# Patient Record
Sex: MALE | Race: WHITE
[De-identification: names, ages, dates, MRNs, and addresses within clinical notes are randomized per-mention and may not be internally consistent; named-entity substitution may affect disease eponyms.]

---

## 2021-01-20 ENCOUNTER — HOSPITAL ENCOUNTER (INPATIENT)
Dept: HOSPITAL 56 - MW.ED | Age: 36
Discharge: LEFT BEFORE DISCHARGE | DRG: 603 | End: 2021-01-20
Attending: INTERNAL MEDICINE | Admitting: INTERNAL MEDICINE
Payer: SELF-PAY

## 2021-01-20 DIAGNOSIS — Z20.822: ICD-10-CM

## 2021-01-20 DIAGNOSIS — F17.200: ICD-10-CM

## 2021-01-20 DIAGNOSIS — L03.115: Primary | ICD-10-CM

## 2021-01-20 DIAGNOSIS — B95.62: ICD-10-CM

## 2021-01-20 LAB
BUN SERPL-MCNC: 19 MG/DL (ref 7–18)
CHLORIDE SERPL-SCNC: 103 MMOL/L (ref 98–107)
CO2 SERPL-SCNC: 28.3 MMOL/L (ref 21–32)
GLUCOSE SERPL-MCNC: 108 MG/DL (ref 74–106)
POTASSIUM SERPL-SCNC: 3.9 MMOL/L (ref 3.5–5.1)
SODIUM SERPL-SCNC: 139 MMOL/L (ref 136–148)

## 2021-01-20 PROCEDURE — C9113 INJ PANTOPRAZOLE SODIUM, VIA: HCPCS

## 2021-01-20 PROCEDURE — U0002 COVID-19 LAB TEST NON-CDC: HCPCS

## 2021-01-20 NOTE — EDM.PDOC
ED HPI GENERAL MEDICAL PROBLEM





- General


Chief Complaint: Skin Complaint


Stated Complaint: POSSIBLE SPIDER BITE


Time Seen by Provider: 01/20/21 13:10


Source of Information: Reports: Patient


History Limitations: Reports: No Limitations





- History of Present Illness


INITIAL COMMENTS - FREE TEXT/NARRATIVE: 


HISTORY AND PHYSICAL:





History of present illness:


Patient is a 35-year-old male who presents to the ED with complaints of an 

"infected spider bite" to his right inner knee.  Patient states he felt a "bump"

under the skin of his right medial knee 3 days ago.  He states he has been able 

to express some drainage from the site within the past 24 to 48 hours.  He 

states the area is now red and inflamed with redness extending from mid thigh to

his ankle.  He denies history of intravenous drug use.  He denies recent injury 

or trauma to the area. Patient denies any fever, chills, headache, change in 

vision, syncope or near syncope. Denies any chest pain, back pain, shortness of 

breath or cough. Denies any abdominal pain, nausea, vomiting, diarrhea, 

constipation or dysuria. Has not noted any blood in urine or stool. Patient has 

been eating and drinking appropriately.





Review of systems: 


As per history of present illness and below otherwise all systems reviewed and 

negative.





Past medical history: 


As per history of present illness and as reviewed below otherwise nonco

ntributory.





Surgical history: 


As per history of present illness and as reviewed below otherwise 

noncontributory.





Social history: 


See social history for further information





Family history: 


As per history of present illness and as reviewed below otherwise 

noncontributory.





Physical exam:


General: Well developed and well nourished 35 year old male. Alert and 

orientated x 3. Nontoxic in appearance and in no acute distress. Vital signs are

stable and have been reviewed by me. Nursing notes were reviewed. 


HEENT: Atraumatic, normocephalic, pupils equal and reactive bilaterally, 

negative for conjunctival pallor or scleral icterus, mucous membranes moist, 

trachea midline. No drooling or trismus noted. No meningeal signs. No hot potato

voice noted. 


Lungs: Clear to auscultation bilaterally. No wheezes, rales, or rhonchi.   Chest

nontender. Normal work of breathing, no accessory muscles used.


Heart: S1S2, regular rate and rhythm without overt murmur, gallops, or rubs. No 

JVD. No peripheral edema


Abdomen: Soft, nondistended, nontender. Normoactive bowel sounds. Negative for 

masses or costovertebral tenderness.


Skin: Red open area to right medial knee roughly 1 inch in diameter, multiple 

dried pustules at surface. No drainage present.  Firm to touch, nonfluctuant. 

Visible erythema extending from right mid thigh to ankle.  Areas of redness are 

tender to touch.  The cellulitis was outlined with surgical marker for 

comparison.


Hematologic: No petechiae or purpra. Mucosa appropriate color and normal nail 

bed color and refill.


Extremities: Atraumatic, moves all extremities per self without difficulty or 

deficits, negative for cords or calf pain. SEE SKIN for details. Neurovascular 

unremarkable.


Neuro: Awake, alert, oriented. Cranial nerves II through XII unremarkable. 

Cerebellum unremarkable. Motor and sensory unremarkable throughout. Exam 

nonfocal.


Psychiatric: Mood and affect are appropriate.  Normal thought process. Answering

questions appropriately.





Notes:


*This patient was seen and evaluated during the 2020 SARS-CoV-2 novel 

coronavirus pandemic period.  Community viral transmission is ongoing at time of

this encounter and the emergency department is operating under pandemic response

procedures.





Patient does have a leukocytosis.  Negative lactate.  Blood cultures are 

pending.  Vancomycin hung at this time for the cellulitis.  


X-ray shows soft tissue swelling medial to the knee joint. No bony erosions to 

suggest osteomyelitis. No soft tissue gas is noted.  Negative COVID screening. I

have talked with the patient about today's findings, in addition to providing 

specific details for plan of care.  Reassessment at the time of disposition 

demonstrates that the patient is in no acute distress.  He is agreeable for 

admission.





1445: Dr Ward, hospitalist on-call was consulted on this case.  She is 

agreeable to excepting this patient for further care and management.





Diagnostics:


CBC, CMP, Lactate w/ reflex, BC x 2, wound culture





Therapeutics:


IV fluids, Dilaudid, Vancomycin, nicotine patch





Impression: 


Cellulitis





Plan:


Inpatient admission





Definitive disposition and diagnosis as appropriate pending reevaluation and 

review of above.





  ** sore on right knee, right leg


Pain Score (Numeric/FACES): 8





- Related Data


                                    Allergies











Allergy/AdvReac Type Severity Reaction Status Date / Time


 


No Known Allergies Allergy   Verified 01/20/21 12:30











Home Meds: 


                                    Home Meds





. [No Known Home Meds]  01/20/21 [History]











Past Medical History





- Past Health History


Medical/Surgical History: Denies Medical/Surgical History





Social & Family History





- Family History


Family Medical History: No Pertinent Family History





- Tobacco Use


Tobacco Use Status *Q: Current Every Day Tobacco User


Years of Tobacco use: 20


Packs/Tins Daily: 0.1





- Recreational Drug Use


Recreational Drug Use: Yes


Drug Use in Last 12 Months: Yes


Recreational Drug Type: Reports: Marijuana/Hashish


Recreational Drug Use Frequency: Monthly





ED ROS GENERAL





- Review of Systems


Review Of Systems: Comprehensive ROS is negative, except as noted in HPI.





ED EXAM, SKIN/RASH


Exam: See Below (See dictation)





Course





- Vital Signs


Last Recorded V/S: 


                                Last Vital Signs











Temp  98.6 F   01/20/21 16:13


 


Pulse  82   01/20/21 16:13


 


Resp  18   01/20/21 16:13


 


BP  136/85   01/20/21 16:13


 


Pulse Ox  97   01/20/21 16:13














- Orders/Labs/Meds


Orders: 


                               Active Orders 24 hr











 Category Date Time Status


 


 Admission Status [Patient Status] [ADT] Stat ADT  01/20/21 15:20 Active


 


 CULTURE BLOOD [BC] Stat Lab  01/20/21 13:33 Received


 


 CULTURE BLOOD [BC] Stat Lab  01/20/21 13:54 Received


 


 CULTURE WOUND [RM] Stat Lab  01/20/21 13:23 Received


 


 Nicotine [Habitrol] Med  01/20/21 15:15 Active





 21 mg TRDERM DAILY   


 


 Blood Culture x2 Reflex Set [OM.PC] Stat Oth  01/20/21 13:25 Ordered








                                Medication Orders





Acetaminophen (Tylenol)  650 mg PO Q4H PRN


   PRN Reason: Pain (Mild 1-3)/fever


Pantoprazole Sodium 40 mg/ (Sodium Chloride)  10 mls @ 300 mls/hr IV Q24H PAPITO


Vancomycin HCl 1 gm/ Sodium (Chloride)  250 mls @ 166.667 mls/hr IV Q8H PAPITO


Ibuprofen (Motrin)  400 mg PO Q6H PRN


   PRN Reason: Pain (mild 1-3)


Nicotine (Habitrol)  21 mg TRDERM DAILY PAPITO


   Last Admin: 01/20/21 15:47  Dose: 21 mg


   Documented by: MURDNIC


Ondansetron HCl (Zofran Odt)  4 mg PO Q4H PRN


   PRN Reason: nausea, able to take PO


Temazepam (Restoril)  15 mg PO BEDTIME PRN


   PRN Reason: Sleep


Vancomycin HCl (Pharmacy To Dose - Vancomycin)  1 dose .XX ASDIRECTED [facility]








Labs: 


                                Laboratory Tests











  01/20/21 01/20/21 01/20/21 Range/Units





  13:33 13:33 13:33 


 


WBC  13.05 H    (4.0-11.0)  K/uL


 


RBC  5.02    (4.50-5.90)  M/uL


 


Hgb  15.0    (13.0-17.0)  g/dL


 


Hct  45.3    (38.0-50.0)  %


 


MCV  90.2    (80.0-98.0)  fL


 


MCH  29.9    (27.0-32.0)  pg


 


MCHC  33.1    (31.0-37.0)  g/dL


 


RDW Std Deviation  42.8    (28.0-62.0)  fl


 


RDW Coeff of Ector  13    (11.0-15.0)  %


 


Plt Count  230    (150-400)  K/uL


 


MPV  9.50    (7.40-12.00)  fL


 


Neut % (Auto)  74.2    (48.0-80.0)  %


 


Lymph % (Auto)  16.2    (16.0-40.0)  %


 


Mono % (Auto)  8.6    (0.0-15.0)  %


 


Eos % (Auto)  0.8    (0.0-7.0)  %


 


Baso % (Auto)  0.2    (0.0-1.5)  %


 


Neut # (Auto)  9.7 H    (1.4-5.7)  K/uL


 


Lymph # (Auto)  2.1    (0.6-2.4)  K/uL


 


Mono # (Auto)  1.1 H    (0.0-0.8)  K/uL


 


Eos # (Auto)  0.1    (0.0-0.7)  K/uL


 


Baso # (Auto)  0.0    (0.0-0.1)  K/uL


 


Nucleated RBC %  0.0    /100WBC


 


Nucleated RBCs #  0    K/uL


 


Lactate   0.6   (0.20-2.00)  mmol/L


 


Sodium    139  (136-148)  mmol/L


 


Potassium    3.9  (3.5-5.1)  mmol/L


 


Chloride    103  ()  mmol/L


 


Carbon Dioxide    28.3  (21.0-32.0)  mmol/L


 


BUN    19 H  (7.0-18.0)  mg/dL


 


Creatinine    1.0  (0.8-1.3)  mg/dL


 


Est Cr Clr Drug Dosing    98.80  mL/min


 


Estimated GFR (MDRD)    > 60.0  ml/min


 


Glucose    108 H  ()  mg/dL


 


Calcium    9.3  (8.5-10.1)  mg/dL


 


Total Bilirubin    0.4  (0.2-1.0)  mg/dL


 


AST    16  (15-37)  IU/L


 


ALT    25  (14-63)  IU/L


 


Alkaline Phosphatase    94  ()  U/L


 


Total Protein    7.6  (6.4-8.2)  g/dL


 


Albumin    4.0  (3.4-5.0)  g/dL


 


Globulin    3.6  (2.6-4.0)  g/dL


 


Albumin/Globulin Ratio    1.1  (0.9-1.6)  


 


SARS-CoV-2 RNA (FERNANDO)     (NEGATIVE)  














  01/20/21 Range/Units





  13:35 


 


WBC   (4.0-11.0)  K/uL


 


RBC   (4.50-5.90)  M/uL


 


Hgb   (13.0-17.0)  g/dL


 


Hct   (38.0-50.0)  %


 


MCV   (80.0-98.0)  fL


 


MCH   (27.0-32.0)  pg


 


MCHC   (31.0-37.0)  g/dL


 


RDW Std Deviation   (28.0-62.0)  fl


 


RDW Coeff of Ector   (11.0-15.0)  %


 


Plt Count   (150-400)  K/uL


 


MPV   (7.40-12.00)  fL


 


Neut % (Auto)   (48.0-80.0)  %


 


Lymph % (Auto)   (16.0-40.0)  %


 


Mono % (Auto)   (0.0-15.0)  %


 


Eos % (Auto)   (0.0-7.0)  %


 


Baso % (Auto)   (0.0-1.5)  %


 


Neut # (Auto)   (1.4-5.7)  K/uL


 


Lymph # (Auto)   (0.6-2.4)  K/uL


 


Mono # (Auto)   (0.0-0.8)  K/uL


 


Eos # (Auto)   (0.0-0.7)  K/uL


 


Baso # (Auto)   (0.0-0.1)  K/uL


 


Nucleated RBC %   /100WBC


 


Nucleated RBCs #   K/uL


 


Lactate   (0.20-2.00)  mmol/L


 


Sodium   (136-148)  mmol/L


 


Potassium   (3.5-5.1)  mmol/L


 


Chloride   ()  mmol/L


 


Carbon Dioxide   (21.0-32.0)  mmol/L


 


BUN   (7.0-18.0)  mg/dL


 


Creatinine   (0.8-1.3)  mg/dL


 


Est Cr Clr Drug Dosing   mL/min


 


Estimated GFR (MDRD)   ml/min


 


Glucose   ()  mg/dL


 


Calcium   (8.5-10.1)  mg/dL


 


Total Bilirubin   (0.2-1.0)  mg/dL


 


AST   (15-37)  IU/L


 


ALT   (14-63)  IU/L


 


Alkaline Phosphatase   ()  U/L


 


Total Protein   (6.4-8.2)  g/dL


 


Albumin   (3.4-5.0)  g/dL


 


Globulin   (2.6-4.0)  g/dL


 


Albumin/Globulin Ratio   (0.9-1.6)  


 


SARS-CoV-2 RNA (FERNANDO)  NEGATIVE  (NEGATIVE)  











Meds: 


Medications











Generic Name Dose Route Start Last Admin





  Trade Name Freq  PRN Reason Stop Dose Admin


 


Acetaminophen  650 mg  01/20/21 15:52 





  Tylenol  PO  





  Q4H PRN  





  Pain (Mild 1-3)/fever  


 


Pantoprazole Sodium 40 mg/  10 mls @ 300 mls/hr  01/20/21 16:00 





  Sodium Chloride  IV  





  Q24H PAPITO  


 


Vancomycin HCl 1 gm/ Sodium  250 mls @ 166.667 mls/hr  01/20/21 16:30 





  Chloride  IV  





  Q8H PAPITO  


 


Ibuprofen  400 mg  01/20/21 15:52 





  Motrin  PO  





  Q6H PRN  





  Pain (mild 1-3)  


 


Nicotine  21 mg  01/20/21 15:15  01/20/21 15:47





  Habitrol  TRDERM   21 mg





  DAILY PAPITO   Administration


 


Ondansetron HCl  4 mg  01/20/21 15:52 





  Zofran Odt  PO  





  Q4H PRN  





  nausea, able to take PO  


 


Temazepam  15 mg  01/20/21 21:00 





  Restoril  PO  





  BEDTIME PRN  





  Sleep  


 


Vancomycin HCl  1 dose  01/20/21 16:00 





  Pharmacy To Dose - Vancomycin  .XX  





  ASDIRECTED PAPITO  














Discontinued Medications














Generic Name Dose Route Start Last Admin





  Trade Name An  PRN Reason Stop Dose Admin


 


Hydromorphone HCl  1 mg  01/20/21 13:25  01/20/21 13:34





  Dilaudid  IVPUSH  01/20/21 13:26  1 mg





  ONETIME ONE   Administration


 


Sodium Chloride  1,000 mls @ 999 mls/hr  01/20/21 13:25  01/20/21 13:34





  Normal Saline  IV  01/20/21 14:25  999 mls/hr





  STAT ONE   Administration


 


Vancomycin HCl 1 gm/ Sodium  250 mls @ 166 mls/hr  01/20/21 13:55  01/20/21 

14:41





  Chloride  IV  01/20/21 15:25  166 mls/hr





  ONETIME ONE   Administration














Departure





- Departure


Time of Disposition: 16:23


Disposition: Admitted As Inpatient 65


Clinical Impression: 


Cellulitis


Qualifiers:


 Site of cellulitis: extremity Site of cellulitis of extremity: lower extremity 

Laterality: right Qualified Code(s): L03.115 - Cellulitis of right lower limb








- Discharge Information





Sepsis Event Note (ED)





- Evaluation


Sepsis Screening Result: No Definite Risk





- Focused Exam


Vital Signs: 


                                   Vital Signs











  Temp Pulse Resp BP Pulse Ox


 


 01/20/21 15:35   81   142/76 H  98


 


 01/20/21 12:27  98.1 F  84  16  146/82 H  98














- My Orders


Last 24 Hours: 


My Active Orders





01/20/21 13:23


CULTURE WOUND [RM] Stat 





01/20/21 13:25


Blood Culture x2 Reflex Set [OM.PC] Stat 





01/20/21 13:33


CULTURE BLOOD [BC] Stat 





01/20/21 13:54


CULTURE BLOOD [BC] Stat 





01/20/21 15:15


Nicotine [Habitrol]   21 mg TRDERM DAILY 





01/20/21 15:20


Admission Status [Patient Status] [ADT] Stat 














- Assessment/Plan


Last 24 Hours: 


My Active Orders





01/20/21 13:23


CULTURE WOUND [RM] Stat 





01/20/21 13:25


Blood Culture x2 Reflex Set [OM.PC] Stat 





01/20/21 13:33


CULTURE BLOOD [BC] Stat 





01/20/21 13:54


CULTURE BLOOD [BC] Stat 





01/20/21 15:15


Nicotine [Habitrol]   21 mg TRDERM DAILY 





01/20/21 15:20


Admission Status [Patient Status] [ADT] Stat

## 2021-01-20 NOTE — CR
Indication:



Draining wound medial to patella 



Technique:



Three views right knee



Comparison:



None



Findings:



Bones: Alignment is normal. No fractures or bone lesions. No bony erosions. 

Joint spaces: Unremarkable.  



Soft tissues: Soft tissue swelling medial to the knee joint. No soft tissue 

gas. 



Impression:



Soft tissue swelling medial to the knee joint. No bony erosions to suggest 

osteomyelitis.



Dictated by Katya Choe MD @ Jan 20 2021  2:25PM



Signed by Dr. Katya Choe @ Jan 20 2021  2:27PM

## 2021-01-20 NOTE — US
INDICATION:



Swelling over the medial right knee



TECHNIQUE:



Limited grayscale and color Doppler imaging of the soft tissues medial to 

the right knee performed. 



COMPARISON:



Knee radiographs from same date  



FINDINGS/IMPRESSION: :



There is soft tissue edema medial to the right knee. No abscess or foreign 

body identified.



Dictated by Katya Choe MD @ Jan 20 2021  6:14PM



Signed by Dr. Katya Choe @ Jan 20 2021  6:16PM

## 2021-01-20 NOTE — PCM.HP.2
<Tramaine Anguiano - Last Filed: 01/20/21 19:25>





H&P History of Present Illness





- General


Date of Service: 01/20/21


Admit Problem/Dx: 


                           Admission Diagnosis/Problem





Admission Diagnosis/Problem      Cellulitis








Source of Information: Patient





- History of Present Illness


Initial Comments - Free Text/Narative: 


Patient is a 35-year-old male with a significant past medical history of tobacco

abuse presenting to the ED secondary to infected spider bite to the right inner 

knee.  Patient endorsed to ED provider bump underneath the skin of the right 

knee 3 days prior.  However did notice today that the area is now red and 

inflamed and redness is extending from the mid thigh to the ankle.  Patient 

denies any fevers, chills, body aches, chest pain, shortness of breath.  Denies 

any recent injury and or trauma to the area.  Endorses no history of IV drug 

abuse.  And otherwise is feeling well and in no acute distress.





ED course:


Knee x-ray: Soft tissue swelling noted at medial aspect of knee joint no bony 

erosions to suggest osteomyelitis.


Blood cultures and wound cultures ordered.


Vancomycin initiated.


1 dose of Dilaudid for pain control provided.





Bedside:


Mentions pain in knee especially with movement. Otherwise denies anyother acute 

distress at this time. 





  ** sore on right knee, right leg


Pain Score (Numeric/FACES): 8





- Related Data


Allergies/Adverse Reactions: 


                                    Allergies











Allergy/AdvReac Type Severity Reaction Status Date / Time


 


No Known Allergies Allergy   Verified 01/20/21 12:30











Home Medications: 


                                    Home Meds





. [No Known Home Meds]  01/20/21 [History]











Past Medical History





- Past Health History


Medical/Surgical History: Denies Medical/Surgical History





Social & Family History





- Family History


Family Medical History: No Pertinent Family History





- Tobacco Use


Tobacco Use Status *Q: Current Every Day Tobacco User


Years of Tobacco use: 20


Packs/Tins Daily: 0.1





- Recreational Drug Use


Recreational Drug Use: Yes


Drug Use in Last 12 Months: Yes


Recreational Drug Type: Reports: Marijuana/Hashish


Recreational Drug Use Frequency: Monthly





H&P Review of Systems





- Review of Systems:


Review Of Systems: See Below


General: Denies: Fever, Chills, Malaise, Weakness, Fatigue


HEENT: Reports: No Symptoms


Pulmonary: Reports: No Symptoms


Cardiovascular: Reports: No Symptoms


Gastrointestinal: Reports: No Symptoms


Musculoskeletal: Reports: Joint Pain


Skin: Reports: Erythema


Psychiatric: Reports: No Symptoms


Neurological: Reports: No Symptoms





Exam





- Exam


Exam: See Below





- Vital Signs


Vital Signs: 


                                Last Vital Signs











Temp  98.1 F   01/20/21 12:27


 


Pulse  81   01/20/21 15:35


 


Resp  16   01/20/21 12:27


 


BP  142/76 H  01/20/21 15:35


 


Pulse Ox  98   01/20/21 15:35











Weight: 74.843 kg





- Exam


Quality Assessment: No: Supplemental Oxygen


General: Alert, Oriented, Cooperative


HEENT: EOMI, Mucosa Moist & Pink


Lungs: Clear to Auscultation, Normal Respiratory Effort


Cardiovascular: Regular Rate, Regular Rhythm


GI/Abdominal Exam: Soft, Non-Tender


Back Exam: Normal Inspection


Extremities: Increased Warmth, Redness, Other (Right knee: medial aspect: 

irregular area area of skin beakdown w. overlying granualtion tissue; bloody 

discharge; exquisite tenderness. ROM of knee joint intact. NV intact. sensation 

intact. mild soft tissue swelling noted of right lower extremity compared to 

left. ).  No: Slow Capillary Refill, Tommy's Sign, Limited Range of Motion


Neuro Extensive - Mental Status: Alert, Oriented x3


Psychiatric: Alert, Normal Affect





- Patient Data


Lab Results Last 24 hrs: 


                         Laboratory Results - last 24 hr











  01/20/21 01/20/21 01/20/21 Range/Units





  13:33 13:33 13:33 


 


WBC  13.05 H    (4.0-11.0)  K/uL


 


RBC  5.02    (4.50-5.90)  M/uL


 


Hgb  15.0    (13.0-17.0)  g/dL


 


Hct  45.3    (38.0-50.0)  %


 


MCV  90.2    (80.0-98.0)  fL


 


MCH  29.9    (27.0-32.0)  pg


 


MCHC  33.1    (31.0-37.0)  g/dL


 


RDW Std Deviation  42.8    (28.0-62.0)  fl


 


RDW Coeff of Ector  13    (11.0-15.0)  %


 


Plt Count  230    (150-400)  K/uL


 


MPV  9.50    (7.40-12.00)  fL


 


Neut % (Auto)  74.2    (48.0-80.0)  %


 


Lymph % (Auto)  16.2    (16.0-40.0)  %


 


Mono % (Auto)  8.6    (0.0-15.0)  %


 


Eos % (Auto)  0.8    (0.0-7.0)  %


 


Baso % (Auto)  0.2    (0.0-1.5)  %


 


Neut # (Auto)  9.7 H    (1.4-5.7)  K/uL


 


Lymph # (Auto)  2.1    (0.6-2.4)  K/uL


 


Mono # (Auto)  1.1 H    (0.0-0.8)  K/uL


 


Eos # (Auto)  0.1    (0.0-0.7)  K/uL


 


Baso # (Auto)  0.0    (0.0-0.1)  K/uL


 


Nucleated RBC %  0.0    /100WBC


 


Nucleated RBCs #  0    K/uL


 


Lactate   0.6   (0.20-2.00)  mmol/L


 


Sodium    139  (136-148)  mmol/L


 


Potassium    3.9  (3.5-5.1)  mmol/L


 


Chloride    103  ()  mmol/L


 


Carbon Dioxide    28.3  (21.0-32.0)  mmol/L


 


BUN    19 H  (7.0-18.0)  mg/dL


 


Creatinine    1.0  (0.8-1.3)  mg/dL


 


Est Cr Clr Drug Dosing    98.80  mL/min


 


Estimated GFR (MDRD)    > 60.0  ml/min


 


Glucose    108 H  ()  mg/dL


 


Calcium    9.3  (8.5-10.1)  mg/dL


 


Total Bilirubin    0.4  (0.2-1.0)  mg/dL


 


AST    16  (15-37)  IU/L


 


ALT    25  (14-63)  IU/L


 


Alkaline Phosphatase    94  ()  U/L


 


Total Protein    7.6  (6.4-8.2)  g/dL


 


Albumin    4.0  (3.4-5.0)  g/dL


 


Globulin    3.6  (2.6-4.0)  g/dL


 


Albumin/Globulin Ratio    1.1  (0.9-1.6)  


 


SARS-CoV-2 RNA (FERNANDO)     (NEGATIVE)  














  01/20/21 Range/Units





  13:35 


 


WBC   (4.0-11.0)  K/uL


 


RBC   (4.50-5.90)  M/uL


 


Hgb   (13.0-17.0)  g/dL


 


Hct   (38.0-50.0)  %


 


MCV   (80.0-98.0)  fL


 


MCH   (27.0-32.0)  pg


 


MCHC   (31.0-37.0)  g/dL


 


RDW Std Deviation   (28.0-62.0)  fl


 


RDW Coeff of Ector   (11.0-15.0)  %


 


Plt Count   (150-400)  K/uL


 


MPV   (7.40-12.00)  fL


 


Neut % (Auto)   (48.0-80.0)  %


 


Lymph % (Auto)   (16.0-40.0)  %


 


Mono % (Auto)   (0.0-15.0)  %


 


Eos % (Auto)   (0.0-7.0)  %


 


Baso % (Auto)   (0.0-1.5)  %


 


Neut # (Auto)   (1.4-5.7)  K/uL


 


Lymph # (Auto)   (0.6-2.4)  K/uL


 


Mono # (Auto)   (0.0-0.8)  K/uL


 


Eos # (Auto)   (0.0-0.7)  K/uL


 


Baso # (Auto)   (0.0-0.1)  K/uL


 


Nucleated RBC %   /100WBC


 


Nucleated RBCs #   K/uL


 


Lactate   (0.20-2.00)  mmol/L


 


Sodium   (136-148)  mmol/L


 


Potassium   (3.5-5.1)  mmol/L


 


Chloride   ()  mmol/L


 


Carbon Dioxide   (21.0-32.0)  mmol/L


 


BUN   (7.0-18.0)  mg/dL


 


Creatinine   (0.8-1.3)  mg/dL


 


Est Cr Clr Drug Dosing   mL/min


 


Estimated GFR (MDRD)   ml/min


 


Glucose   ()  mg/dL


 


Calcium   (8.5-10.1)  mg/dL


 


Total Bilirubin   (0.2-1.0)  mg/dL


 


AST   (15-37)  IU/L


 


ALT   (14-63)  IU/L


 


Alkaline Phosphatase   ()  U/L


 


Total Protein   (6.4-8.2)  g/dL


 


Albumin   (3.4-5.0)  g/dL


 


Globulin   (2.6-4.0)  g/dL


 


Albumin/Globulin Ratio   (0.9-1.6)  


 


SARS-CoV-2 RNA (FERNANDO)  NEGATIVE  (NEGATIVE)  











Result Diagrams: 


                                 01/20/21 13:33





                                 01/20/21 13:33





Sepsis Event Note





- Evaluation


Sepsis Screening Result: No Definite Risk





- Focused Exam


Vital Signs: 


                                   Vital Signs











  Temp Pulse Resp BP Pulse Ox


 


 01/20/21 15:35   81   142/76 H  98


 


 01/20/21 12:27  98.1 F  84  16  146/82 H  98











Problem List Initiated/Reviewed/Updated: Yes


Orders Last 24hrs: 


                               Active Orders 24 hr











 Category Date Time Status


 


 Admission Status [Patient Status] [ADT] Stat ADT  01/20/21 15:20 Active


 


 Oxygen Therapy [RC] PRN Care  01/20/21 15:52 Ordered


 


 Up ad Ava [RC] ASDIRECTED Care  01/20/21 15:52 Ordered


 


 VTE/DVT Education [RC] PER UNIT ROUTINE Care  01/20/21 15:52 Ordered


 


 Vital Signs [RC] Q4H Care  01/20/21 15:52 Ordered


 


 Regular Diet [DIET] Diet  01/20/21 Dinner Ordered


 


 CBC WITH AUTO DIFF [HEME] AM Lab  01/21/21 05:11 Ordered


 


 CBC WITH AUTO DIFF [HEME] AM Lab  01/22/21 05:11 Ordered


 


 CBC WITH AUTO DIFF [HEME] AM Lab  01/23/21 05:11 Ordered


 


 COMPREHENSIVE METABOLIC PN,CMP [CHEM] AM Lab  01/21/21 05:11 Ordered


 


 COMPREHENSIVE METABOLIC PN,CMP [CHEM] AM Lab  01/22/21 05:11 Ordered


 


 COMPREHENSIVE METABOLIC PN,CMP [CHEM] AM Lab  01/23/21 05:11 Ordered


 


 CULTURE BLOOD [BC] Stat Lab  01/20/21 13:33 Received


 


 CULTURE BLOOD [BC] Stat Lab  01/20/21 13:54 Received


 


 CULTURE WOUND [RM] Stat Lab  01/20/21 13:23 Received


 


 DRUG SCREEN, URINE [URCHEM] Routine Lab  01/20/21 15:54 Ordered


 


 Acetaminophen [TylenoL] Med  01/20/21 15:52 Ordered





 650 mg PO Q4H PRN   


 


 Ibuprofen [Motrin] Med  01/20/21 15:52 Ordered





 400 mg PO Q6H PRN   


 


 Nicotine [Habitrol] Med  01/20/21 15:15 Active





 21 mg TRDERM DAILY   


 


 Ondansetron [Zofran ODT] Med  01/20/21 15:52 Ordered





 4 mg PO Q4H PRN   


 


 Pantoprazole [ProTONIX IV***] Med  01/20/21 16:00 Ordered





 40 mg IV Q24H   


 


 Temazepam [Restoril] Med  01/20/21 15:52 Ordered





 15 mg PO BEDTIME PRN   


 


 Blood Culture x2 Reflex Set [OM.PC] Stat Oth  01/20/21 13:25 Ordered


 


 Resuscitation Status Routine Resus Stat  01/20/21 15:52 Ordered








                                Medication Orders





Acetaminophen (Tylenol)  650 mg PO Q4H PRN


   PRN Reason: Pain (Mild 1-3)/fever


Ibuprofen (Motrin)  400 mg PO Q6H PRN


   PRN Reason: Pain (mild 1-3)


Nicotine (Habitrol)  21 mg TRDERM DAILY PAPITO


   Last Admin: 01/20/21 15:47  Dose: 21 mg


   Documented by: TSERING


Ondansetron HCl (Zofran Odt)  4 mg PO Q4H PRN


   PRN Reason: nausea, able to take PO


Pantoprazole Sodium (Protonix Iv***)  40 mg IV Q24H PAPITO


Temazepam (Restoril)  15 mg PO BEDTIME PRN


   PRN Reason: Sleep








Assessment/Plan Comment:: 


Assessment


1.  Right lower extremity cellulitis


2.  Leukocytosis secondary to above


3.  Past medical history: Tobacco abuse, marijuana abuse 





Plan


Admit to inpatient.  Full code.  I's and O's per routine.  Vitals per routine





1.  Right lower extremity cellulitis; continue vancomycin as initiated in the 

ED. 


Blood cultures ordered. 


May consider further imaging including MRI to rule out osteomyelitis however at 

this time joint/range of motion is intact and x-ray does not suggest any bony 

erosions.











UPDATE: 


patient during stay requested to leave AMA due to some "personal" things to 

attend to (endorsed to bedside nurse wanting , not needing, to do some Meth). 

Explained in detail to patient risks to oneself if leaving AMA e.g :worsening 

infection, advancement of infection into joint, blood infection, etc. pt 

understood risks and signed AMA form. Will provide patient a prescription of 

Clindamycin for 1 week and advised patient to return to ED immediately if 

redness, swelling and or pain becomes worse. Advised to also follow up with PCP 

PRN and within 1-week. Discussed methamphetamine abuse and its role in worsening

 infection; pt states he understood. denies use of needle but smokes 

methamphetamine. 





<Ed,Hooria - Last Filed: 01/22/21 13:51>





H&P History of Present Illness





- General


Admit Problem/Dx: 


                           Admission Diagnosis/Problem





Admission Diagnosis/Problem      Cellulitis











Exam





- Vital Signs


Vital Signs: 


                                Last Vital Signs











Temp  37.0 C   01/20/21 16:13


 


Pulse  82   01/20/21 16:13


 


Resp  18   01/20/21 16:13


 


BP  136/85   01/20/21 16:13


 


Pulse Ox  97   01/20/21 16:13














- Patient Data


Result Diagrams: 


                                 01/20/21 13:33





                                 01/20/21 13:33


Merlin Results Last 24 hrs: 


                                  Microbiology











 01/20/21 13:33 Aerobic Blood Culture - Preliminary





 Blood - Venous    NO GROWTH AFTER 2 DAYS





 Anaerobic Blood Culture - Preliminary





    NO GROWTH AFTER 2 DAYS


 


 01/20/21 13:23 Wound Culture - Final





 Knee, Right    (Mrsa) Staphylococcus Aureus


 


 01/20/21 13:54 Aerobic Blood Culture - Preliminary





 Blood - Venous - Lab Draw    NO GROWTH AFTER 1 DAY





 Anaerobic Blood Culture - Preliminary





    NO GROWTH AFTER 1 DAY











Assessment/Plan Comment:: 





Patient left AMA without being seen by me, based on resident assessment i agree 

with the plan. Patient was advised to come back if swelling gets worse.

## 2021-01-22 NOTE — PCM.SN.2
- Free Text/Narrative


Note: 





Called patient back regarding Bcx results. This pt. left AMA recently after 

desiring to go home and do ilicit drugs. 


Bcx: positive for MRSA; called patient and informed him of results. Pt 

understood and agreed to return to the ED for possible readmission.

## 2021-01-24 ENCOUNTER — HOSPITAL ENCOUNTER (EMERGENCY)
Dept: HOSPITAL 56 - MW.ED | Age: 36
Discharge: HOME | End: 2021-01-24
Payer: SELF-PAY

## 2021-01-24 DIAGNOSIS — L03.115: Primary | ICD-10-CM

## 2021-01-24 LAB
BUN SERPL-MCNC: 12 MG/DL (ref 7–18)
CHLORIDE SERPL-SCNC: 103 MMOL/L (ref 98–107)
CO2 SERPL-SCNC: 29.4 MMOL/L (ref 21–32)
GLUCOSE SERPL-MCNC: 108 MG/DL (ref 74–106)
POTASSIUM SERPL-SCNC: 4 MMOL/L (ref 3.5–5.1)
SODIUM SERPL-SCNC: 141 MMOL/L (ref 136–148)

## 2021-01-24 NOTE — EDM.PDOC
ED HPI GENERAL MEDICAL PROBLEM





- General


Chief Complaint: Skin Complaint


Stated Complaint: ABCESS ON LEG/DRAINAGE


Time Seen by Provider: 01/24/21 12:23


Source of Information: Reports: Patient


History Limitations: Reports: No Limitations





- History of Present Illness


INITIAL COMMENTS - FREE TEXT/NARRATIVE: 


HISTORY AND PHYSICAL:





History of present illness:


Patient is a 35-year-old male who presents to the emergency room with complaints

of draining "spider bite". Patient was seen in the emergency room by myself on 

1/20/2021 for "spider bite" to the right medial knee that is now turned into 

cellulitis.  Patient received vancomycin through the emergency room and was 

ultimately admitted to the hospital for further care and management.  It was 

noted that the patient left AGAINST MEDICAL ADVICE due to "personal reasons" and

he was given a prescription for clindamycin.  Reviewing the patient's chart the 

wound culture shows he has MRSA.  Vancomycin and clindamycin are both last

ceptible.  Patient reports he did not fill the clindamycin as he was not able to

afford it.  The redness has greatly improved although he does have drainage from

the centralized pustule which we had noted on 1/20/2021.  Patient denies any 

fever, chills, headache, change in vision, syncope or near syncope. Denies any 

chest pain, back pain, shortness of breath or cough. Denies any abdominal pain, 

nausea, vomiting, diarrhea, constipation or dysuria. Has not noted any blood in 

urine or stool. Patient has been eating and drinking appropriately.





Review of systems: 


As per history of present illness and below otherwise all systems reviewed and 

negative.





Past medical history: 


As per history of present illness and as reviewed below otherwise 

noncontributory.





Surgical history: 


As per history of present illness and as reviewed below otherwise 

noncontributory.





Social history: 


See social history for further information





Family history: 


As per history of present illness and as reviewed below otherwise 

noncontributory.





Physical exam:


General: Well developed and well nourished 35-year-old male. Alert and 

orientated x 3. Nontoxic in appearance and in no acute distress. Vital signs are

stable and have been reviewed by me. Nursing notes were reviewed. 


HEENT: Atraumatic, normocephalic, pupils equal and reactive bilaterally, 

negative for conjunctival pallor or scleral icterus, mucous membranes moist, 

trachea midline. No drooling or trismus noted. No meningeal signs. No hot potato

voice noted. 


Lungs: Clear to auscultation bilaterally. No wheezes, rales, or rhonchi.   Chest

nontender. Normal work of breathing, no accessory muscles used.


Heart: S1S2, regular rate and rhythm without overt murmur, gallops, or rubs. No 

JVD. No peripheral edema


Abdomen: Soft, nondistended, nontender. Normoactive bowel sounds. Negative for 

masses or costovertebral tenderness.


Skin: Tennis ball sized area of erythema to the right medial knee with granular 

tissue/purulent drainage at site (size of a quarter). No surrounding cellulitis.

Remaining skin is intact, warm, dry. No lesions or rashes noted.


Hematologic: No petechiae or purpra. Mucosa appropriate color and normal nail 

bed color and refill.


Extremities: Atraumatic, moves all extremities per self without difficulty or 

deficits, negative for cords or calf pain. Neurovascular unremarkable.


Neuro: Awake, alert, oriented. Cranial nerves II through XII unremarkable. 

Cerebellum unremarkable. Motor and sensory unremarkable throughout. Exam 

nonfocal.


Psychiatric: Mood and affect are appropriate.  Normal thought process. Answering

questions appropriately.





Notes:


*This patient was seen and evaluated during the 2020 SARS-CoV-2 novel 

coronavirus pandemic period.  Community viral transmission is ongoing at time of

this encounter and the emergency department is operating under pandemic response

procedures.





1/20/21: Ultrasound of the right knee shows soft tissue edema medial to the 

right knee.  No abscess or foreign body is identified.  X-ray shows no fracture 

or bony lesions/erosions to suggest osteomyelitis.  Soft tissue swelling noted. 

No soft tissue gas. Wound culture shows MRSA infection. Blood culture shows no 

growth. WBC was 13.05, lactate 0.6. Drug screen positive for methamphetamines 

and amphetamines - does admit to drug use.





Patient's leg looks better from when I saw him on 1/20/21. During his first ER 

visit he had cellulitis that extended from the medial ankle to midthigh. The 

erythema has minimized to just the right medial knee cap with granular tissue/ 

drainage.  Area was thoroughly cleansed and irrigated.  Nonstick dressing has 

been applied. Patient tolerated well.





Patient states he is unable to afford the antibiotics, we will use [facility] 

to provide the clindamycin for him.  I have personally went to the [facility] and filled this for the patient since he does appear unreliable.  I have

talked with the patient about today's findings, in addition to providing 

specific details for plan of care.  Reassessment at the time of disposition 

demonstrates that the patient is in no acute distress.  The patient is stable 

for discharge, counseling was provided and we discussed in great detail signs an

d symptoms that would prompt them to return to the Emergency Department. 

Medication, follow up and supportive care measures were reviewed and discussed. 

Voices understanding and is agreeable to plan of care. Denies any further 

questions or concerns at this time.





Diagnostics:


CBC, CMP





Therapeutics:


Wound Cleaning





Prescription:


Clindamycin ([facility])





Impression: 


Cellulitis, right knee





Plan:


1.  Please stop using drugs. Your labs have improved since your last ER visit. 

We have provided you with Clindamycin, please take this as directed. Keep your 

skin clean and dry. Continue to monitor your skin for signs of improvement. If 

your symptoms should worsen, new symptoms develop or any of the signs and 

symptoms we discussed should arise please return to the emergency room or call 

911 (if needed).


2.  You can alternate Tylenol and ibuprofen as needed for pain and fever 

management.


3. We encourage you to follow up with your primary care provider and/or 

recommended specialist in the next few days for re-evaluation and further 

care/management. 





Definitive disposition and diagnosis as appropriate pending reevaluation and 

review of above.





  ** right knee


Pain Score (Numeric/FACES): 4





- Related Data


                                    Allergies











Allergy/AdvReac Type Severity Reaction Status Date / Time


 


No Known Allergies Allergy   Verified 01/24/21 13:51











Home Meds: 


                                    Home Meds





. [No Known Home Meds]  01/20/21 [History]











Past Medical History





- Past Health History


Medical/Surgical History: Denies Medical/Surgical History


Cardiovascular History: Reports: None


Respiratory History: Reports: None


Gastrointestinal History: Reports: None


Musculoskeletal History: Reports: None


Psychiatric History: Reports: None





- Past Surgical History


Cardiovascular Surgical History: Reports: None


Respiratory Surgical History: Reports: None


GI Surgical History: Reports: None


Male  Surgical History: Reports: None


Endocrine Surgical History: Reports: None


Neurological Surgical History: Reports: None


Musculoskeletal Surgical History: Reports: None





Social & Family History





- Family History


Family Medical History: No Pertinent Family History


Cardiac: Reports: None


Respiratory: Reports: None


GI: Reports: None


: Reports: None


OBGYN: Reports: None


Musculoskeletal: Reports: None


Neurological: Reports: None


Psychiatric: Reports: None





- Caffeine Use


Caffeine Use: Reports: Energy Drinks, Soda





ED ROS GENERAL





- Review of Systems


Review Of Systems: Comprehensive ROS is negative, except as noted in HPI.





ED EXAM, SKIN/RASH


Exam: See Below (See dictation)





Course





- Vital Signs


Last Recorded V/S: 


                                Last Vital Signs











Temp  98.2 F   01/24/21 13:44


 


Pulse  78   01/24/21 13:44


 


Resp  16   01/24/21 13:44


 


BP  136/94 H  01/24/21 13:44


 


Pulse Ox  95   01/24/21 13:44














- Orders/Labs/Meds


Orders: 


                               Active Orders 24 hr











 Category Date Time Status


 


 Communication Order [RC] STAT Care  01/24/21 13:18 Active











Labs: 


                                Laboratory Tests











  01/24/21 01/24/21 Range/Units





  13:28 13:28 


 


WBC  10.29   (4.0-11.0)  K/uL


 


RBC  5.26   (4.50-5.90)  M/uL


 


Hgb  15.5   (13.0-17.0)  g/dL


 


Hct  47.4   (38.0-50.0)  %


 


MCV  90.1   (80.0-98.0)  fL


 


MCH  29.5   (27.0-32.0)  pg


 


MCHC  32.7   (31.0-37.0)  g/dL


 


RDW Std Deviation  42.0   (28.0-62.0)  fl


 


RDW Coeff of Ector  13   (11.0-15.0)  %


 


Plt Count  280   (150-400)  K/uL


 


MPV  9.60   (7.40-12.00)  fL


 


Neut % (Auto)  74.6   (48.0-80.0)  %


 


Lymph % (Auto)  17.7   (16.0-40.0)  %


 


Mono % (Auto)  6.9   (0.0-15.0)  %


 


Eos % (Auto)  0.6   (0.0-7.0)  %


 


Baso % (Auto)  0.2   (0.0-1.5)  %


 


Neut # (Auto)  7.7 H   (1.4-5.7)  K/uL


 


Lymph # (Auto)  1.8   (0.6-2.4)  K/uL


 


Mono # (Auto)  0.7   (0.0-0.8)  K/uL


 


Eos # (Auto)  0.1   (0.0-0.7)  K/uL


 


Baso # (Auto)  0.0   (0.0-0.1)  K/uL


 


Nucleated RBC %  0.0   /100WBC


 


Nucleated RBCs #  0   K/uL


 


Sodium   141  (136-148)  mmol/L


 


Potassium   4.0  (3.5-5.1)  mmol/L


 


Chloride   103  ()  mmol/L


 


Carbon Dioxide   29.4  (21.0-32.0)  mmol/L


 


BUN   12  (7.0-18.0)  mg/dL


 


Creatinine   1.1  (0.8-1.3)  mg/dL


 


Est Cr Clr Drug Dosing   89.82  mL/min


 


Estimated GFR (MDRD)   > 60.0  ml/min


 


Glucose   108 H  ()  mg/dL


 


Calcium   9.8  (8.5-10.1)  mg/dL


 


Total Bilirubin   0.4  (0.2-1.0)  mg/dL


 


AST   14 L  (15-37)  IU/L


 


ALT   27  (14-63)  IU/L


 


Alkaline Phosphatase   94  ()  U/L


 


Total Protein   7.9  (6.4-8.2)  g/dL


 


Albumin   4.1  (3.4-5.0)  g/dL


 


Globulin   3.8  (2.6-4.0)  g/dL


 


Albumin/Globulin Ratio   1.1  (0.9-1.6)  














Departure





- Departure


Time of Disposition: 14:12


Disposition: Home, Self-Care 01


Clinical Impression: 


 Cellulitis of knee, right








- Discharge Information


Instructions:  Cellulitis, Adult


Referrals: 


PCP,None [Primary Care Provider] - 


Forms:  ED Department Discharge


Additional Instructions: 


The following information is given to patients seen in the emergency department 

who are being discharged to home. This information is to outline your options 

for follow-up care. We provide all patients seen in our emergency department 

with a follow-up referral.





The need for follow-up, as well as the timing and circumstances, are variable 

depending upon the specifics of your emergency department visit.





If you don't have a primary care physician on staff, we will provide you with a 

referral. We always advise you to contact your personal physician following an 

emergency department visit to inform them of the circumstance of the visit and 

for follow-up with them and/or the need for any referrals to a consulting 

specialist.





The emergency department will also refer you to a specialist when appropriate. 

This referral assures that you have the opportunity for follow-up care with a 

specialist. All of these measure are taken in an effort to provide you with 

optimal care, which includes your follow-up.





Under all circumstances we always encourage you to contact your private 

physician who remains a resource for coordinating your care. When calling for 

follow-up care, please make the office aware that this follow-up is from your 

recent emergency room visit. If for any reason you are refused follow-up, please

contact the [facility] Emergency Department

at [phone] and asked to speak to the emergency department charge nurse.





[facility]


Primary Care


[address] [place] [id number]


Phone: [phone]


Fax: [phone]





[place] [address]


Phone: [phone]


Fax: [phone]





Thank you for choosing the [facility] emergency department in 

[place] for your medical needs today.  It was a pleasure caring for you. Today

you were seen in the emergency department for skin infection.





1.  Please stop using drugs. Your labs have improved since your last ER visit. 

We have provided you with Clindamycin, please take this as directed. 1 tab by 

mouth four times daily x 7 days. Keep your skin clean and dry. Continue to 

monitor your skin for signs of improvement. If your symptoms should worsen, new 

symptoms develop or any of the signs and symptoms we discussed should arise 

please return to the emergency room or call 911 (if needed).


2.  You can alternate Tylenol and ibuprofen as needed for pain and fever 

management.


3. We encourage you to follow up with your primary care provider and/or 

recommended specialist in the next few days for re-evaluation and further 

care/management. 





Sepsis Event Note (ED)





- Focused Exam


Vital Signs: 


                                   Vital Signs











  Temp Pulse Resp BP Pulse Ox


 


 01/24/21 13:44  98.2 F  78  16  136/94 H  95














- My Orders


Last 24 Hours: 


My Active Orders





01/24/21 13:18


Communication Order [RC] STAT 














- Assessment/Plan


Last 24 Hours: 


My Active Orders





01/24/21 13:18


Communication Order [RC] STAT